# Patient Record
Sex: FEMALE | Race: BLACK OR AFRICAN AMERICAN | NOT HISPANIC OR LATINO | Employment: UNEMPLOYED | URBAN - METROPOLITAN AREA
[De-identification: names, ages, dates, MRNs, and addresses within clinical notes are randomized per-mention and may not be internally consistent; named-entity substitution may affect disease eponyms.]

---

## 2020-01-22 PROBLEM — D50.9 IRON DEFICIENCY ANEMIA: Status: ACTIVE | Noted: 2020-01-22

## 2020-01-22 PROBLEM — E04.1 THYROID NODULE: Status: ACTIVE | Noted: 2020-01-22

## 2020-01-22 PROBLEM — E11.9 TYPE 2 DIABETES MELLITUS WITHOUT COMPLICATION, WITHOUT LONG-TERM CURRENT USE OF INSULIN: Status: ACTIVE | Noted: 2020-01-22

## 2020-01-22 PROBLEM — E28.2 PCOS (POLYCYSTIC OVARIAN SYNDROME): Status: ACTIVE | Noted: 2020-01-22

## 2020-01-22 PROBLEM — I10 ESSENTIAL HYPERTENSION, MALIGNANT: Status: ACTIVE | Noted: 2020-01-22

## 2020-01-22 PROBLEM — E03.9 PRIMARY HYPOTHYROIDISM: Status: ACTIVE | Noted: 2020-01-22

## 2020-01-22 PROBLEM — E88.810 DYSMETABOLIC SYNDROME X: Status: ACTIVE | Noted: 2020-01-22

## 2020-02-06 PROBLEM — R80.8 OTHER PROTEINURIA: Status: ACTIVE | Noted: 2020-02-06

## 2020-02-27 ENCOUNTER — OFFICE VISIT (OUTPATIENT)
Dept: ENDOCRINOLOGY | Facility: CLINIC | Age: 40
End: 2020-02-27
Payer: COMMERCIAL

## 2020-02-27 VITALS
HEIGHT: 63 IN | DIASTOLIC BLOOD PRESSURE: 39 MMHG | BODY MASS INDEX: 32.46 KG/M2 | SYSTOLIC BLOOD PRESSURE: 149 MMHG | HEART RATE: 81 BPM | WEIGHT: 183.19 LBS

## 2020-02-27 DIAGNOSIS — R53.83 FATIGUE, UNSPECIFIED TYPE: ICD-10-CM

## 2020-02-27 DIAGNOSIS — E11.9 TYPE 2 DIABETES MELLITUS WITHOUT COMPLICATION, WITHOUT LONG-TERM CURRENT USE OF INSULIN: ICD-10-CM

## 2020-02-27 DIAGNOSIS — E28.2 PCOS (POLYCYSTIC OVARIAN SYNDROME): Primary | ICD-10-CM

## 2020-02-27 DIAGNOSIS — I10 ESSENTIAL HYPERTENSION: ICD-10-CM

## 2020-02-27 DIAGNOSIS — L68.0 HIRSUTISM: ICD-10-CM

## 2020-02-27 PROCEDURE — 99204 OFFICE O/P NEW MOD 45 MIN: CPT | Mod: S$GLB,,, | Performed by: INTERNAL MEDICINE

## 2020-02-27 PROCEDURE — 99999 PR PBB SHADOW E&M-NEW PATIENT-LVL III: CPT | Mod: PBBFAC,,, | Performed by: INTERNAL MEDICINE

## 2020-02-27 PROCEDURE — 3044F HG A1C LEVEL LT 7.0%: CPT | Mod: CPTII,S$GLB,, | Performed by: INTERNAL MEDICINE

## 2020-02-27 PROCEDURE — 3078F PR MOST RECENT DIASTOLIC BLOOD PRESSURE < 80 MM HG: ICD-10-PCS | Mod: CPTII,S$GLB,, | Performed by: INTERNAL MEDICINE

## 2020-02-27 PROCEDURE — 3008F BODY MASS INDEX DOCD: CPT | Mod: CPTII,S$GLB,, | Performed by: INTERNAL MEDICINE

## 2020-02-27 PROCEDURE — 3077F PR MOST RECENT SYSTOLIC BLOOD PRESSURE >= 140 MM HG: ICD-10-PCS | Mod: CPTII,S$GLB,, | Performed by: INTERNAL MEDICINE

## 2020-02-27 PROCEDURE — 3044F PR MOST RECENT HEMOGLOBIN A1C LEVEL <7.0%: ICD-10-PCS | Mod: CPTII,S$GLB,, | Performed by: INTERNAL MEDICINE

## 2020-02-27 PROCEDURE — 3077F SYST BP >= 140 MM HG: CPT | Mod: CPTII,S$GLB,, | Performed by: INTERNAL MEDICINE

## 2020-02-27 PROCEDURE — 3008F PR BODY MASS INDEX (BMI) DOCUMENTED: ICD-10-PCS | Mod: CPTII,S$GLB,, | Performed by: INTERNAL MEDICINE

## 2020-02-27 PROCEDURE — 99999 PR PBB SHADOW E&M-NEW PATIENT-LVL III: ICD-10-PCS | Mod: PBBFAC,,, | Performed by: INTERNAL MEDICINE

## 2020-02-27 PROCEDURE — 99204 PR OFFICE/OUTPT VISIT, NEW, LEVL IV, 45-59 MIN: ICD-10-PCS | Mod: S$GLB,,, | Performed by: INTERNAL MEDICINE

## 2020-02-27 PROCEDURE — 3078F DIAST BP <80 MM HG: CPT | Mod: CPTII,S$GLB,, | Performed by: INTERNAL MEDICINE

## 2020-02-27 RX ORDER — NORELGESTROMIN AND ETHINYL ESTRADIOL 150; 35 UG/D; UG/D
PATCH TRANSDERMAL
COMMUNITY
Start: 2020-02-13

## 2020-02-27 NOTE — PROGRESS NOTES
""This note will be shared with the patient"Subjective:       Patient ID: Sonia Vegas is a 39 y.o. female.  Patient is new to me    Records were reviewed      Chief Complaint: Polycystic Ovary Syndrome      Consultation requested by Aaareferral Self    HPI    Patient states at around 21yo dx w PCOS  15 yo menses and her menstrual cycles have always been irregular    Heavy bleeding, one time lasted 9 months then disappeared for a year, painful periods, has tried on her life to get pregnant but was unsuccessful, currently  and raising 3 children for of her 's  Needed blood transfusions for anemia, hadn't worked in  Years as cycles were unpredicatble  Sees Dr. Cindy Alicia for gynecology, tried tranexamic acid and helped for a short time, now on  xulane  patch, has has D and C  No fibroids, had ovarian cyst rupture and possible uterine polyp  Lost 17 lbs recently with diet and exercise    Has always had hirsutism mostly involving the face, she has extra hair also on her arms a few scattered hair elsewhere such as around her areola    Admits to undergoing marital stressors    Has some tiredness and fatigue at times    Also has hypertension on medication    Tried spironolactone and metformin in past    Has diabetes that is diet controlled, recent A1c was 6.4    I have reviewed the past medical, family and social history    Review of Systems   Constitutional: Positive for fatigue. Negative for appetite change, fever and unexpected weight change.   HENT: Negative for sore throat and trouble swallowing.    Eyes: Negative for visual disturbance.   Respiratory: Negative for shortness of breath and wheezing.    Cardiovascular: Negative for chest pain, palpitations and leg swelling.   Gastrointestinal: Negative for diarrhea, nausea and vomiting.   Endocrine: Negative for cold intolerance, heat intolerance, polydipsia, polyphagia and polyuria.   Genitourinary: Positive for menstrual problem. Negative for difficulty " urinating and dysuria.   Musculoskeletal: Negative for arthralgias and joint swelling.   Skin: Negative for rash.   Neurological: Negative for dizziness, weakness, numbness and headaches.   Psychiatric/Behavioral: Negative for confusion, dysphoric mood and sleep disturbance.       Objective:      Physical Exam   Constitutional: She is oriented to person, place, and time. She appears well-developed and well-nourished. No distress.   HENT:   Head: Normocephalic and atraumatic.   Right Ear: External ear normal.   Left Ear: External ear normal.   Nose: Nose normal.   Mouth/Throat: Oropharynx is clear and moist. No oropharyngeal exudate.   Eyes: Pupils are equal, round, and reactive to light. Conjunctivae and EOM are normal. No scleral icterus.   Neck: No JVD present. No tracheal deviation present. No thyromegaly present.   Cardiovascular: Normal rate, regular rhythm, normal heart sounds and intact distal pulses. Exam reveals no gallop and no friction rub.   No murmur heard.  Pulmonary/Chest: Effort normal and breath sounds normal. No respiratory distress. She has no wheezes. She has no rales.   Abdominal: Soft. Bowel sounds are normal. She exhibits no distension and no mass. There is no tenderness. There is no rebound and no guarding. No hernia.   Musculoskeletal: She exhibits no edema or deformity.   Lymphadenopathy:     She has no cervical adenopathy.   Neurological: She is alert and oriented to person, place, and time. She has normal reflexes. No cranial nerve deficit.   Skin: Skin is warm. No rash noted. She is not diaphoretic. No erythema.   Does have mild hyperpigmentation in posterior neck area, some stubble on chin, pale abdominal striae    Psychiatric: She has a normal mood and affect. Her behavior is normal.   Vitals reviewed.        Lab Review:   Office Visit on 01/22/2020   Component Date Value    Microalbum.,U,Random 01/22/2020 30     Spec Grav UA 01/22/2020 1.005     pH, UA 01/22/2020 6.5     WBC, UA  01/22/2020 neg     Blood, UA 01/22/2020 neg     Nitrite, UA 01/22/2020 neg     Ketones, UA 01/22/2020 neg     Bilirubin 01/22/2020 neg     Urobilinogen, UA 01/22/2020 0.2     Protein 01/22/2020 neg     Glucose, UA 01/22/2020 neg     Bacteria 01/22/2020 None Seen     White Blood Cells 01/22/2020 none seen     RBC Cells Counted 01/22/2020 none seen     Epithelial Cells, Ua 01/22/2020 0-2     Glucose 01/22/2020 111*    BUN, Bld 01/22/2020 8     Creatinine 01/22/2020 0.90     eGFR if non African Amer* 01/22/2020 82     eGFR if African American 01/22/2020 94     BUN/Creatinine Ratio 01/22/2020 9     Sodium 01/22/2020 143     Potassium 01/22/2020 4.0     Chloride 01/22/2020 103     CO2 01/22/2020 24     Calcium 01/22/2020 9.7     Total Protein 01/22/2020 8.1     Albumin 01/22/2020 4.8     Globulin, Total 01/22/2020 3.3     Albumin/Globulin Ratio 01/22/2020 1.5     Total Bilirubin 01/22/2020 0.2     Alkaline Phosphatase 01/22/2020 100     AST 01/22/2020 16     ALT 01/22/2020 13     A1c 01/22/2020 6.4     Cholesterol 01/22/2020 195     Triglycerides 01/22/2020 95     HDL 01/22/2020 44     VLDL Cholesterol Jose 01/22/2020 19     LDL Calculated 01/22/2020 132*    DHEA-SO4 01/22/2020 44.2*    LH 01/22/2020 8.8     Follicle Stimulating Hor* 01/22/2020 5.7     Ferritin 01/22/2020 27     T3, Free 01/22/2020 3.5     T4, Free 01/22/2020 1.20     TSH 01/22/2020 0.763     Testosterone 01/22/2020 16     Testosterone, Free 01/22/2020 1.9        Assessment:     1. PCOS (polycystic ovarian syndrome)  17-Hydroxyprogesterone    Androstenedione    Prolactin    ACTH    Cortisol, 8AM    Glucose, fasting    Cortisol, urine, free    Creatinine, urine, timed 24 Hours    Aldosterone    Renin   2. Hirsutism  17-Hydroxyprogesterone    Androstenedione    Prolactin    ACTH    Cortisol, 8AM    Glucose, fasting    Cortisol, urine, free    Creatinine, urine, timed 24 Hours    Aldosterone    Renin   3. Fatigue,  unspecified type  17-Hydroxyprogesterone    Androstenedione    Prolactin    ACTH    Cortisol, 8AM    Glucose, fasting    Cortisol, urine, free    Creatinine, urine, timed 24 Hours    Aldosterone    Renin    CBC auto differential   4. Essential hypertension  17-Hydroxyprogesterone    Androstenedione    Prolactin    ACTH    Cortisol, 8AM    Glucose, fasting    Cortisol, urine, free    Creatinine, urine, timed 24 Hours    Aldosterone    Renin   5. Type 2 diabetes mellitus without complication, without long-term current use of insulin  Insulin, random    she has some features of PCOS, obesity and metabolic syndrome can have similar features and also would like to rule out Cushing's and congenital adrenal hyperplasia    May benefit from going back on metformin, continuing the estrogen progesterone patches may be helpful to keep suppression of androgens and adding spironolactone may be helpful as well, can also consider medications such as Trulicity since she is a diabetic which have positive weight loss side effects, she is thinking of getting laser treatment also for hirsutism  Plan:   PCOS (polycystic ovarian syndrome)  -     17-Hydroxyprogesterone; Future; Expected date: 02/27/2020  -     Androstenedione; Future; Expected date: 02/27/2020  -     Prolactin; Future; Expected date: 02/27/2020  -     ACTH; Future; Expected date: 02/27/2020  -     Cortisol, 8AM; Future; Expected date: 02/27/2020  -     Glucose, fasting; Future; Expected date: 02/27/2020  -     Cortisol, urine, free; Future  -     Creatinine, urine, timed 24 Hours; Future  -     Aldosterone; Future; Expected date: 02/27/2020  -     Renin; Future; Expected date: 02/27/2020    Hirsutism  -     17-Hydroxyprogesterone; Future; Expected date: 02/27/2020  -     Androstenedione; Future; Expected date: 02/27/2020  -     Prolactin; Future; Expected date: 02/27/2020  -     ACTH; Future; Expected date: 02/27/2020  -     Cortisol, 8AM; Future; Expected date:  02/27/2020  -     Glucose, fasting; Future; Expected date: 02/27/2020  -     Cortisol, urine, free; Future  -     Creatinine, urine, timed 24 Hours; Future  -     Aldosterone; Future; Expected date: 02/27/2020  -     Renin; Future; Expected date: 02/27/2020    Fatigue, unspecified type  -     17-Hydroxyprogesterone; Future; Expected date: 02/27/2020  -     Androstenedione; Future; Expected date: 02/27/2020  -     Prolactin; Future; Expected date: 02/27/2020  -     ACTH; Future; Expected date: 02/27/2020  -     Cortisol, 8AM; Future; Expected date: 02/27/2020  -     Glucose, fasting; Future; Expected date: 02/27/2020  -     Cortisol, urine, free; Future  -     Creatinine, urine, timed 24 Hours; Future  -     Aldosterone; Future; Expected date: 02/27/2020  -     Renin; Future; Expected date: 02/27/2020  -     CBC auto differential; Future; Expected date: 02/27/2020    Essential hypertension  -     17-Hydroxyprogesterone; Future; Expected date: 02/27/2020  -     Androstenedione; Future; Expected date: 02/27/2020  -     Prolactin; Future; Expected date: 02/27/2020  -     ACTH; Future; Expected date: 02/27/2020  -     Cortisol, 8AM; Future; Expected date: 02/27/2020  -     Glucose, fasting; Future; Expected date: 02/27/2020  -     Cortisol, urine, free; Future  -     Creatinine, urine, timed 24 Hours; Future  -     Aldosterone; Future; Expected date: 02/27/2020  -     Renin; Future; Expected date: 02/27/2020    Type 2 diabetes mellitus without complication, without long-term current use of insulin  -     Insulin, random; Future; Expected date: 02/27/2020          No follow-ups on file.

## 2020-02-29 ENCOUNTER — LAB VISIT (OUTPATIENT)
Dept: LAB | Facility: HOSPITAL | Age: 40
End: 2020-02-29
Payer: COMMERCIAL

## 2020-02-29 DIAGNOSIS — R53.83 FATIGUE, UNSPECIFIED TYPE: ICD-10-CM

## 2020-02-29 DIAGNOSIS — E28.2 PCOS (POLYCYSTIC OVARIAN SYNDROME): ICD-10-CM

## 2020-02-29 DIAGNOSIS — L68.0 HIRSUTISM: ICD-10-CM

## 2020-02-29 DIAGNOSIS — I10 ESSENTIAL HYPERTENSION: ICD-10-CM

## 2020-02-29 LAB
CREAT 24H UR-MRATE: 55.8 MG/HR (ref 40–75)
CREAT UR-MCNC: 94 MG/DL (ref 15–325)
CREATININE, URINE (MG/SPEC): 1339.5 MG/SPEC
URINE COLLECTION DURATION: 24 HR
URINE VOLUME: 1425 ML

## 2020-02-29 PROCEDURE — 82570 ASSAY OF URINE CREATININE: CPT

## 2020-03-03 ENCOUNTER — LAB VISIT (OUTPATIENT)
Dept: LAB | Facility: HOSPITAL | Age: 40
End: 2020-03-03
Attending: INTERNAL MEDICINE
Payer: COMMERCIAL

## 2020-03-03 DIAGNOSIS — I10 ESSENTIAL HYPERTENSION: ICD-10-CM

## 2020-03-03 DIAGNOSIS — L68.0 HIRSUTISM: ICD-10-CM

## 2020-03-03 DIAGNOSIS — R53.83 FATIGUE, UNSPECIFIED TYPE: ICD-10-CM

## 2020-03-03 DIAGNOSIS — E28.2 PCOS (POLYCYSTIC OVARIAN SYNDROME): ICD-10-CM

## 2020-03-03 DIAGNOSIS — E11.9 TYPE 2 DIABETES MELLITUS WITHOUT COMPLICATION, WITHOUT LONG-TERM CURRENT USE OF INSULIN: ICD-10-CM

## 2020-03-03 LAB
BASOPHILS # BLD AUTO: 0.02 K/UL (ref 0–0.2)
BASOPHILS NFR BLD: 0.3 % (ref 0–1.9)
CORTIS SERPL-MCNC: 15 UG/DL (ref 4.3–22.4)
DIFFERENTIAL METHOD: ABNORMAL
EOSINOPHIL # BLD AUTO: 0.3 K/UL (ref 0–0.5)
EOSINOPHIL NFR BLD: 3.6 % (ref 0–8)
ERYTHROCYTE [DISTWIDTH] IN BLOOD BY AUTOMATED COUNT: 19.4 % (ref 11.5–14.5)
GLUCOSE SERPL-MCNC: 106 MG/DL (ref 70–110)
HCT VFR BLD AUTO: 37.4 % (ref 37–48.5)
HGB BLD-MCNC: 10.3 G/DL (ref 12–16)
IMM GRANULOCYTES # BLD AUTO: 0.03 K/UL (ref 0–0.04)
IMM GRANULOCYTES NFR BLD AUTO: 0.4 % (ref 0–0.5)
INSULIN COLLECTION INTERVAL: NORMAL
INSULIN SERPL-ACNC: 20.3 UU/ML
LYMPHOCYTES # BLD AUTO: 1.7 K/UL (ref 1–4.8)
LYMPHOCYTES NFR BLD: 22.7 % (ref 18–48)
MCH RBC QN AUTO: 22.1 PG (ref 27–31)
MCHC RBC AUTO-ENTMCNC: 27.5 G/DL (ref 32–36)
MCV RBC AUTO: 80 FL (ref 82–98)
MONOCYTES # BLD AUTO: 0.3 K/UL (ref 0.3–1)
MONOCYTES NFR BLD: 4 % (ref 4–15)
NEUTROPHILS # BLD AUTO: 5.2 K/UL (ref 1.8–7.7)
NEUTROPHILS NFR BLD: 69 % (ref 38–73)
NRBC BLD-RTO: 0 /100 WBC
PLATELET # BLD AUTO: 250 K/UL (ref 150–350)
PMV BLD AUTO: 11.3 FL (ref 9.2–12.9)
PROLACTIN SERPL IA-MCNC: 15.2 NG/ML (ref 5.2–26.5)
RBC # BLD AUTO: 4.66 M/UL (ref 4–5.4)
WBC # BLD AUTO: 7.49 K/UL (ref 3.9–12.7)

## 2020-03-03 PROCEDURE — 84146 ASSAY OF PROLACTIN: CPT

## 2020-03-03 PROCEDURE — 82947 ASSAY GLUCOSE BLOOD QUANT: CPT

## 2020-03-03 PROCEDURE — 82533 TOTAL CORTISOL: CPT

## 2020-03-03 PROCEDURE — 36415 COLL VENOUS BLD VENIPUNCTURE: CPT

## 2020-03-03 PROCEDURE — 83498 ASY HYDROXYPROGESTERONE 17-D: CPT

## 2020-03-03 PROCEDURE — 82024 ASSAY OF ACTH: CPT

## 2020-03-03 PROCEDURE — 83525 ASSAY OF INSULIN: CPT

## 2020-03-03 PROCEDURE — 85025 COMPLETE CBC W/AUTO DIFF WBC: CPT

## 2020-03-03 PROCEDURE — 82157 ASSAY OF ANDROSTENEDIONE: CPT

## 2020-03-03 PROCEDURE — 82088 ASSAY OF ALDOSTERONE: CPT

## 2020-03-03 PROCEDURE — 84244 ASSAY OF RENIN: CPT

## 2020-03-06 LAB
17OHP SERPL-MCNC: 66 NG/DL (ref 35–413)
ACTH PLAS-MCNC: 14 PG/ML (ref 0–46)
RENIN PLAS-CCNC: 4 NG/ML/H

## 2020-03-09 LAB
ALDOST SERPL-MCNC: 13.6 NG/DL
ANDROST SERPL-MCNC: 67 NG/DL

## 2020-03-23 ENCOUNTER — TELEPHONE (OUTPATIENT)
Dept: ENDOCRINOLOGY | Facility: CLINIC | Age: 40
End: 2020-03-23

## 2020-03-23 RX ORDER — METFORMIN HYDROCHLORIDE 500 MG/1
1000 TABLET, EXTENDED RELEASE ORAL
Qty: 60 TABLET | Refills: 3 | Status: SHIPPED | OUTPATIENT
Start: 2020-03-23

## 2020-03-23 NOTE — TELEPHONE ENCOUNTER
Pt did not answer but I will be calling back this evening.     ----- Message from Malissa Bragg MD sent at 3/23/2020  8:50 AM CDT -----  Please let patient know her blood test shows her insulin in fasting blood sugars were a little high, I recommend starting back on extended-release metformin 500 mg tablet to take 2 a day but start back taking 1 with dinner for at least a week or to to see if has side effects then can increase to 2 with a meal.  Also the 24 urine only tested for creatinine and not the cortisol so in order to screen for Cushing's will need to do another test.  Because of this travel restrictions can wait and do this test at follow-up, please schedule for month office visit and her labs do show anemia but this is stable compared to older test